# Patient Record
Sex: FEMALE | Race: OTHER | HISPANIC OR LATINO | URBAN - METROPOLITAN AREA
[De-identification: names, ages, dates, MRNs, and addresses within clinical notes are randomized per-mention and may not be internally consistent; named-entity substitution may affect disease eponyms.]

---

## 2023-02-06 ENCOUNTER — EMERGENCY (EMERGENCY)
Facility: HOSPITAL | Age: 38
LOS: 1 days | Discharge: HOME | End: 2023-02-06
Attending: EMERGENCY MEDICINE
Payer: MEDICAID

## 2023-02-06 VITALS
SYSTOLIC BLOOD PRESSURE: 129 MMHG | TEMPERATURE: 99 F | HEART RATE: 113 BPM | RESPIRATION RATE: 18 BRPM | OXYGEN SATURATION: 97 % | WEIGHT: 130.51 LBS | HEIGHT: 60 IN | DIASTOLIC BLOOD PRESSURE: 80 MMHG

## 2023-02-06 VITALS — HEART RATE: 90 BPM

## 2023-02-06 LAB
ALBUMIN SERPL ELPH-MCNC: 4.2 G/DL — SIGNIFICANT CHANGE UP (ref 3.3–5.2)
ALP SERPL-CCNC: 87 U/L — SIGNIFICANT CHANGE UP (ref 40–120)
ALT FLD-CCNC: 28 U/L — SIGNIFICANT CHANGE UP
ANION GAP SERPL CALC-SCNC: 11 MMOL/L — SIGNIFICANT CHANGE UP (ref 5–17)
APPEARANCE UR: CLEAR — SIGNIFICANT CHANGE UP
AST SERPL-CCNC: 30 U/L — SIGNIFICANT CHANGE UP
BACTERIA # UR AUTO: ABNORMAL
BASOPHILS # BLD AUTO: 0.02 K/UL — SIGNIFICANT CHANGE UP (ref 0–0.2)
BASOPHILS NFR BLD AUTO: 0.4 % — SIGNIFICANT CHANGE UP (ref 0–2)
BILIRUB SERPL-MCNC: 1 MG/DL — SIGNIFICANT CHANGE UP (ref 0.4–2)
BILIRUB UR-MCNC: NEGATIVE — SIGNIFICANT CHANGE UP
BLD GP AB SCN SERPL QL: SIGNIFICANT CHANGE UP
BUN SERPL-MCNC: 10.7 MG/DL — SIGNIFICANT CHANGE UP (ref 8–20)
CALCIUM SERPL-MCNC: 8.3 MG/DL — LOW (ref 8.4–10.5)
CHLORIDE SERPL-SCNC: 104 MMOL/L — SIGNIFICANT CHANGE UP (ref 96–108)
CO2 SERPL-SCNC: 23 MMOL/L — SIGNIFICANT CHANGE UP (ref 22–29)
COLOR SPEC: YELLOW — SIGNIFICANT CHANGE UP
CREAT SERPL-MCNC: 0.44 MG/DL — LOW (ref 0.5–1.3)
DIFF PNL FLD: ABNORMAL
EGFR: 126 ML/MIN/1.73M2 — SIGNIFICANT CHANGE UP
EOSINOPHIL # BLD AUTO: 0.13 K/UL — SIGNIFICANT CHANGE UP (ref 0–0.5)
EOSINOPHIL NFR BLD AUTO: 2.4 % — SIGNIFICANT CHANGE UP (ref 0–6)
EPI CELLS # UR: SIGNIFICANT CHANGE UP
GLUCOSE SERPL-MCNC: 101 MG/DL — HIGH (ref 70–99)
GLUCOSE UR QL: NEGATIVE MG/DL — SIGNIFICANT CHANGE UP
HCT VFR BLD CALC: 41.2 % — SIGNIFICANT CHANGE UP (ref 34.5–45)
HGB BLD-MCNC: 13.9 G/DL — SIGNIFICANT CHANGE UP (ref 11.5–15.5)
IMM GRANULOCYTES NFR BLD AUTO: 0.4 % — SIGNIFICANT CHANGE UP (ref 0–0.9)
KETONES UR-MCNC: NEGATIVE — SIGNIFICANT CHANGE UP
LEUKOCYTE ESTERASE UR-ACNC: NEGATIVE — SIGNIFICANT CHANGE UP
LIDOCAIN IGE QN: 20 U/L — LOW (ref 22–51)
LYMPHOCYTES # BLD AUTO: 1.6 K/UL — SIGNIFICANT CHANGE UP (ref 1–3.3)
LYMPHOCYTES # BLD AUTO: 29.6 % — SIGNIFICANT CHANGE UP (ref 13–44)
MCHC RBC-ENTMCNC: 29.8 PG — SIGNIFICANT CHANGE UP (ref 27–34)
MCHC RBC-ENTMCNC: 33.7 GM/DL — SIGNIFICANT CHANGE UP (ref 32–36)
MCV RBC AUTO: 88.4 FL — SIGNIFICANT CHANGE UP (ref 80–100)
MONOCYTES # BLD AUTO: 0.62 K/UL — SIGNIFICANT CHANGE UP (ref 0–0.9)
MONOCYTES NFR BLD AUTO: 11.5 % — SIGNIFICANT CHANGE UP (ref 2–14)
NEUTROPHILS # BLD AUTO: 3.02 K/UL — SIGNIFICANT CHANGE UP (ref 1.8–7.4)
NEUTROPHILS NFR BLD AUTO: 55.7 % — SIGNIFICANT CHANGE UP (ref 43–77)
NITRITE UR-MCNC: NEGATIVE — SIGNIFICANT CHANGE UP
OB PNL STL: NEGATIVE — SIGNIFICANT CHANGE UP
PH UR: 7 — SIGNIFICANT CHANGE UP (ref 5–8)
PLATELET # BLD AUTO: 333 K/UL — SIGNIFICANT CHANGE UP (ref 150–400)
POTASSIUM SERPL-MCNC: 4 MMOL/L — SIGNIFICANT CHANGE UP (ref 3.5–5.3)
POTASSIUM SERPL-SCNC: 4 MMOL/L — SIGNIFICANT CHANGE UP (ref 3.5–5.3)
PROT SERPL-MCNC: 7.3 G/DL — SIGNIFICANT CHANGE UP (ref 6.6–8.7)
PROT UR-MCNC: NEGATIVE — SIGNIFICANT CHANGE UP
RBC # BLD: 4.66 M/UL — SIGNIFICANT CHANGE UP (ref 3.8–5.2)
RBC # FLD: 12.6 % — SIGNIFICANT CHANGE UP (ref 10.3–14.5)
RBC CASTS # UR COMP ASSIST: SIGNIFICANT CHANGE UP /HPF (ref 0–4)
SODIUM SERPL-SCNC: 138 MMOL/L — SIGNIFICANT CHANGE UP (ref 135–145)
SP GR SPEC: 1.01 — SIGNIFICANT CHANGE UP (ref 1.01–1.02)
UROBILINOGEN FLD QL: NEGATIVE MG/DL — SIGNIFICANT CHANGE UP
WBC # BLD: 5.41 K/UL — SIGNIFICANT CHANGE UP (ref 3.8–10.5)
WBC # FLD AUTO: 5.41 K/UL — SIGNIFICANT CHANGE UP (ref 3.8–10.5)
WBC UR QL: SIGNIFICANT CHANGE UP /HPF (ref 0–5)

## 2023-02-06 PROCEDURE — 96361 HYDRATE IV INFUSION ADD-ON: CPT

## 2023-02-06 PROCEDURE — 74178 CT ABD&PLV WO CNTR FLWD CNTR: CPT | Mod: MA

## 2023-02-06 PROCEDURE — 85025 COMPLETE CBC W/AUTO DIFF WBC: CPT

## 2023-02-06 PROCEDURE — 84702 CHORIONIC GONADOTROPIN TEST: CPT

## 2023-02-06 PROCEDURE — 80053 COMPREHEN METABOLIC PANEL: CPT

## 2023-02-06 PROCEDURE — 36415 COLL VENOUS BLD VENIPUNCTURE: CPT

## 2023-02-06 PROCEDURE — 96374 THER/PROPH/DIAG INJ IV PUSH: CPT | Mod: XU

## 2023-02-06 PROCEDURE — 99284 EMERGENCY DEPT VISIT MOD MDM: CPT

## 2023-02-06 PROCEDURE — 74178 CT ABD&PLV WO CNTR FLWD CNTR: CPT | Mod: 26,MA

## 2023-02-06 PROCEDURE — 86850 RBC ANTIBODY SCREEN: CPT

## 2023-02-06 PROCEDURE — 86900 BLOOD TYPING SEROLOGIC ABO: CPT

## 2023-02-06 PROCEDURE — 87086 URINE CULTURE/COLONY COUNT: CPT

## 2023-02-06 PROCEDURE — 83690 ASSAY OF LIPASE: CPT

## 2023-02-06 PROCEDURE — 81001 URINALYSIS AUTO W/SCOPE: CPT

## 2023-02-06 PROCEDURE — 86901 BLOOD TYPING SEROLOGIC RH(D): CPT

## 2023-02-06 PROCEDURE — 99284 EMERGENCY DEPT VISIT MOD MDM: CPT | Mod: 25

## 2023-02-06 PROCEDURE — 82272 OCCULT BLD FECES 1-3 TESTS: CPT

## 2023-02-06 RX ORDER — PANTOPRAZOLE SODIUM 20 MG/1
1 TABLET, DELAYED RELEASE ORAL
Qty: 14 | Refills: 0
Start: 2023-02-06 | End: 2023-02-19

## 2023-02-06 RX ORDER — ONDANSETRON 8 MG/1
1 TABLET, FILM COATED ORAL
Qty: 9 | Refills: 0
Start: 2023-02-06 | End: 2023-02-08

## 2023-02-06 RX ORDER — PANTOPRAZOLE SODIUM 20 MG/1
80 TABLET, DELAYED RELEASE ORAL ONCE
Refills: 0 | Status: COMPLETED | OUTPATIENT
Start: 2023-02-06 | End: 2023-02-06

## 2023-02-06 RX ORDER — SODIUM CHLORIDE 9 MG/ML
1000 INJECTION INTRAMUSCULAR; INTRAVENOUS; SUBCUTANEOUS ONCE
Refills: 0 | Status: COMPLETED | OUTPATIENT
Start: 2023-02-06 | End: 2023-02-06

## 2023-02-06 RX ADMIN — PANTOPRAZOLE SODIUM 80 MILLIGRAM(S): 20 TABLET, DELAYED RELEASE ORAL at 17:11

## 2023-02-06 RX ADMIN — SODIUM CHLORIDE 1000 MILLILITER(S): 9 INJECTION INTRAMUSCULAR; INTRAVENOUS; SUBCUTANEOUS at 18:13

## 2023-02-06 RX ADMIN — SODIUM CHLORIDE 1000 MILLILITER(S): 9 INJECTION INTRAMUSCULAR; INTRAVENOUS; SUBCUTANEOUS at 17:12

## 2023-02-06 NOTE — ED PROVIDER NOTE - OBJECTIVE STATEMENT
37 yoF with PMHx of s/p cholecystectomy now p/w abdominal pain-- midepigastric radiating to the back since this morning with associated nausea, NBNB emesis, and black stools. Denies diarrhea, smoking, fever, chills, hx of pancreatitis, hx of colonoscopy.     PMH: s/p cholecystectomy; LNMP: 1/15/23; Allergy to Antalgina (medication given in [place])  SOCIAL: -social EtOH use, denies tobacco/illicit drug use; : Brett

## 2023-02-06 NOTE — ED PROVIDER NOTE - CLINICAL SUMMARY MEDICAL DECISION MAKING FREE TEXT BOX
(ANITA Martinez MD) Initial Assessment: (2/6/2023 1619) 27 y/p female with PMHx of cholelithiasis s/p cholecystectomy p/w epigastric pain with dark stools x1 day. Found on exam with LLQ TTP and potential internal hemorrhoids that may be bleeding. Will get labs, CT abd/pel, protonix, and re-assess. Likely will need GI follow up.   ACP to complete summary of medical encounter below.    Summary of Clinical Encounter: (ANITA Martinez MD) Initial Assessment: (2/6/2023 9299) 37 y/p female with PMHx of cholelithiasis s/p cholecystectomy p/w epigastric pain with dark stools x1 day. Found on exam with LLQ TTP and potential internal hemorrhoids that may be bleeding. Will get labs, CT abd/pel, protonix, and re-assess. Likely will need GI follow up.   ACP to complete summary of medical encounter below.    Mansoor LARSON @ 22:45-- 37F presenting with epigastric abd pain radiating to her back x1 day with n/v and black stools. Pts labs wnl, stool guaiac negative, UA negative for infection. Pt resting comfortably in NAD at this time. Pending CT read. (ANITA Martinez MD) Initial Assessment: (2/6/2023 1619) 37 y/p female with PMHx of cholelithiasis s/p cholecystectomy p/w epigastric pain with dark stools x1 day. Found on exam with LLQ TTP and potential internal hemorrhoids that may be bleeding. Will get labs, CT abd/pel, protonix, and re-assess. Likely will need GI follow up.   ACP to complete summary of medical encounter below.    Mansoor LARSON @ 22:45-- 37F presenting with epigastric abd pain radiating to her back x1 day with n/v and black stools. Pts labs wnl, stool guaiac negative, UA negative for infection. Pt resting comfortably in NAD at this time. CT scan shows no GI bleed, with a dilated CBD. Pt is s/p cholecystectomy therefore dilated CBD likely due to past surgery. Pts LFTs/bilirubin within normal limit, pt states that she feels much better and her abd is soft/nt on re-exam. Pt stable for d/c with outpt GI f/u. [id number] Comprehensive

## 2023-02-06 NOTE — ED PROVIDER NOTE - CARE PROVIDER_API CALL
Bronson Mathews)  Gastroenterology; Internal Medicine  [address] [place] [address]  Phone: [phone]  Fax: [phone]  Follow Up Time:

## 2023-02-06 NOTE — ED PROVIDER NOTE - NS ED ATTENDING STATEMENT MOD
This was a shared visit with the EZ. I reviewed and verified the documentation and independently performed the documented:

## 2023-02-06 NOTE — ED ADULT TRIAGE NOTE - CHIEF COMPLAINT QUOTE
pt c/o left side abd pain started this am, + NVD  A&Ox3, resp wnl, crying, having black stool & feels lumps by anus

## 2023-02-06 NOTE — ED PROVIDER NOTE - PHYSICAL EXAMINATION
General:     NAD, well-nourished, well-appearing  Head:     NC/AT, EOMI, oral mucosa moist  Neck:     trachea midline  Lungs:     CTA b/l, no w/r/r  CVS:     S1S2, RRR, no m/g/r  Abd:     +BS, s/(+) TTP @ LLQ, greater than epigastrium, nd, no organomegaly  Gu:      (+) palpable internal hemorrhoid at 5 o'clock, dark stool no active bleeding (Chaperone Rosio White, Scribe)  Ext:    2+ radial and pedal pulses, no c/c/e  Neuro: AAOx3, no sensory/motor deficits

## 2023-02-06 NOTE — ED PROVIDER NOTE - DR. NAME
Amie Sepulveda is a 31 y.o. female.    Chief Complaint   Patient presents with   • Anxiety   • Depression       HPI   Patient presents today to establish care.  She has a long history of anxiety and depression.  She reports trying zoloft, prozac, lexapro.  Zoloft caused worsening depression.  Prozac worked okay for a little while.  Has been on lexapro for the last several months without response. Has tried abilify in the past and reports feeling very restless with this medication.  Admits feelings of hopelessness and worthlessness.  She denies crying spells.  Reports always nervous and worried.  Does not have frequent anxiety.  She denies trouble sleeping.  She feels like she could sleep at any time of the day.  Not currently breast feeding.  She is open to counseling.  Did telehealth counseling with previous insurance.   She also reports h/o ADHD.  She was previously on adderall for this with improvement in symptoms.  She does admit to weight loss and fatigue.       The following portions of the patient's history were reviewed and updated as appropriate: allergies, current medications, past family history, past medical history, past social history, past surgical history and problem list.     Past Medical History:   Diagnosis Date   • ADHD    • Anxiety    • Depression        Past Surgical History:   Procedure Laterality Date   • APPENDECTOMY     • DILATATION AND CURETTAGE     • ULNAR NERVE TRANSPOSITION     • WISDOM TOOTH EXTRACTION         Family History   Problem Relation Age of Onset   • Lung cancer Paternal Grandfather         smoker   • Hypertension Father    • Diabetes Maternal Grandfather    • Kidney disease Paternal Grandmother    • Heart disease Paternal Grandmother        Social History     Socioeconomic History   • Marital status:      Spouse name: Not on file   • Number of children: Not on file   • Years of education: Not on file   • Highest education level: Not on file   Tobacco Use   •  "Smoking status: Never Smoker   • Smokeless tobacco: Never Used   Substance and Sexual Activity   • Alcohol use: No   • Drug use: No   • Sexual activity: Yes     Partners: Male       Allergies   Allergen Reactions   • Penicillins Rash         Current Outpatient Medications:   •  escitalopram (LEXAPRO) 10 MG tablet, Take 10 mg by mouth Daily., Disp: , Rfl:     ROS    Review of Systems   Constitutional: Positive for appetite change, fatigue and unexpected weight loss. Negative for chills and fever.   HENT: Negative for congestion, postnasal drip and sore throat.    Eyes: Negative for blurred vision and visual disturbance.   Respiratory: Negative for cough, shortness of breath and wheezing.    Cardiovascular: Negative for chest pain and leg swelling.   Gastrointestinal: Negative for abdominal pain, constipation, diarrhea, nausea and vomiting.   Endocrine: Positive for cold intolerance. Negative for heat intolerance.   Genitourinary: Negative for dysuria and frequency.   Musculoskeletal: Negative for arthralgias and back pain.   Skin: Negative for color change and rash.   Allergic/Immunologic: Negative for environmental allergies.   Neurological: Negative for weakness, numbness and headache.   Hematological: Does not bruise/bleed easily.   Psychiatric/Behavioral: Positive for depressed mood. The patient is nervous/anxious.        Vitals:    09/20/21 1521   BP: 100/68   Pulse: 78   Resp: 16   Temp: 98 °F (36.7 °C)   SpO2: 98%   Weight: 46.6 kg (102 lb 12.8 oz)   Height: 157.5 cm (62\")   PainSc: 0-No pain     Body mass index is 18.8 kg/m².    Physical Exam     Physical Exam  Constitutional:       General: She is not in acute distress.     Appearance: Normal appearance. She is well-developed.   HENT:      Head: Normocephalic and atraumatic.      Right Ear: Tympanic membrane and external ear normal.      Left Ear: Tympanic membrane and external ear normal.   Eyes:      Extraocular Movements: Extraocular movements intact.     "  Conjunctiva/sclera: Conjunctivae normal.      Pupils: Pupils are equal, round, and reactive to light.   Cardiovascular:      Rate and Rhythm: Normal rate and regular rhythm.      Heart sounds: No murmur heard.     Pulmonary:      Effort: Pulmonary effort is normal. No respiratory distress.      Breath sounds: Normal breath sounds. No wheezing.   Abdominal:      General: Bowel sounds are normal. There is no distension.      Palpations: Abdomen is soft.      Tenderness: There is no abdominal tenderness.   Musculoskeletal:      Cervical back: Normal range of motion and neck supple.      Right lower leg: No edema.      Left lower leg: No edema.   Lymphadenopathy:      Cervical: No cervical adenopathy.   Skin:     General: Skin is warm and dry.   Neurological:      Mental Status: She is alert and oriented to person, place, and time.      Cranial Nerves: No cranial nerve deficit.      Deep Tendon Reflexes: Reflexes normal.   Psychiatric:         Mood and Affect: Mood normal.         Behavior: Behavior normal.         Assessment/Plan    Problems Addressed this Visit        Mental Health    Anxiety and depression - Primary     Uncontrolled on current medication.  At this time patient will continue Lexapro.  Will obtain GeneSight testing to determine most appropriate medication for the patient.  Discussed possibly trying Paxil to help with weight gain and mental health.         Relevant Medications    escitalopram (LEXAPRO) 10 MG tablet    Other Relevant Orders    GeneSight - Swab,    Attention deficit hyperactivity disorder (ADHD)    Relevant Medications    escitalopram (LEXAPRO) 10 MG tablet    Other Relevant Orders    Ambulatory Referral to Psychiatry    GeneSight - Swab,      Other Visit Diagnoses     Fatigue, unspecified type        Relevant Orders    CBC & Differential (Completed)    Comprehensive Metabolic Panel (Completed)    TSH (Completed)    T4, Free (Completed)    Vitamin B12 (Completed)    Folate (Completed)     Vitamin D 25 Hydroxy (Completed)    Weight loss        Relevant Orders    TSH (Completed)    T4, Free (Completed)     No orders of the defined types were placed in this encounter.      No orders of the defined types were placed in this encounter.      Return in about 6 weeks (around 11/1/2021) for anxiety and depression.      Swati Alba,    Juan

## 2023-02-07 LAB
CULTURE RESULTS: SIGNIFICANT CHANGE UP
SPECIMEN SOURCE: SIGNIFICANT CHANGE UP

## 2023-03-10 NOTE — ED PROVIDER NOTE - CARE PLAN
1 Principal Discharge DX:	Abdominal pain   Skyrizi Counseling: I discussed with the patient the risks of risankizumab-rzaa including but not limited to immunosuppression, and serious infections.  The patient understands that monitoring is required including a PPD at baseline and must alert us or the primary physician if symptoms of infection or other concerning signs are noted.

## 2023-06-03 ENCOUNTER — EMERGENCY (EMERGENCY)
Facility: HOSPITAL | Age: 38
LOS: 1 days | Discharge: HOME | End: 2023-06-03
Attending: EMERGENCY MEDICINE
Payer: MEDICAID

## 2023-06-03 VITALS
DIASTOLIC BLOOD PRESSURE: 77 MMHG | RESPIRATION RATE: 18 BRPM | HEART RATE: 90 BPM | TEMPERATURE: 99 F | WEIGHT: 129.63 LBS | SYSTOLIC BLOOD PRESSURE: 114 MMHG | OXYGEN SATURATION: 99 %

## 2023-06-03 LAB
ALBUMIN SERPL ELPH-MCNC: 3.9 G/DL — SIGNIFICANT CHANGE UP (ref 3.3–5.2)
ALP SERPL-CCNC: 89 U/L — SIGNIFICANT CHANGE UP (ref 40–120)
ALT FLD-CCNC: 28 U/L — SIGNIFICANT CHANGE UP
ANION GAP SERPL CALC-SCNC: 11 MMOL/L — SIGNIFICANT CHANGE UP (ref 5–17)
AST SERPL-CCNC: 37 U/L — HIGH
BASOPHILS # BLD AUTO: 0.04 K/UL — SIGNIFICANT CHANGE UP (ref 0–0.2)
BASOPHILS NFR BLD AUTO: 0.9 % — SIGNIFICANT CHANGE UP (ref 0–2)
BILIRUB SERPL-MCNC: 0.6 MG/DL — SIGNIFICANT CHANGE UP (ref 0.4–2)
BUN SERPL-MCNC: 13.3 MG/DL — SIGNIFICANT CHANGE UP (ref 8–20)
CALCIUM SERPL-MCNC: 8.7 MG/DL — SIGNIFICANT CHANGE UP (ref 8.4–10.5)
CHLORIDE SERPL-SCNC: 104 MMOL/L — SIGNIFICANT CHANGE UP (ref 96–108)
CO2 SERPL-SCNC: 21 MMOL/L — LOW (ref 22–29)
CREAT SERPL-MCNC: 0.48 MG/DL — LOW (ref 0.5–1.3)
EGFR: 123 ML/MIN/1.73M2 — SIGNIFICANT CHANGE UP
EOSINOPHIL # BLD AUTO: 0.16 K/UL — SIGNIFICANT CHANGE UP (ref 0–0.5)
EOSINOPHIL NFR BLD AUTO: 3.5 % — SIGNIFICANT CHANGE UP (ref 0–6)
GIANT PLATELETS BLD QL SMEAR: PRESENT — SIGNIFICANT CHANGE UP
GLUCOSE SERPL-MCNC: 92 MG/DL — SIGNIFICANT CHANGE UP (ref 70–99)
HCG SERPL-ACNC: <4 MIU/ML — SIGNIFICANT CHANGE UP
HCT VFR BLD CALC: 40.2 % — SIGNIFICANT CHANGE UP (ref 34.5–45)
HGB BLD-MCNC: 13.8 G/DL — SIGNIFICANT CHANGE UP (ref 11.5–15.5)
LIDOCAIN IGE QN: 21 U/L — LOW (ref 22–51)
LYMPHOCYTES # BLD AUTO: 0.83 K/UL — LOW (ref 1–3.3)
LYMPHOCYTES # BLD AUTO: 18.2 % — SIGNIFICANT CHANGE UP (ref 13–44)
MANUAL SMEAR VERIFICATION: SIGNIFICANT CHANGE UP
MCHC RBC-ENTMCNC: 30.6 PG — SIGNIFICANT CHANGE UP (ref 27–34)
MCHC RBC-ENTMCNC: 34.3 GM/DL — SIGNIFICANT CHANGE UP (ref 32–36)
MCV RBC AUTO: 89.1 FL — SIGNIFICANT CHANGE UP (ref 80–100)
MONOCYTES # BLD AUTO: 0.52 K/UL — SIGNIFICANT CHANGE UP (ref 0–0.9)
MONOCYTES NFR BLD AUTO: 11.3 % — SIGNIFICANT CHANGE UP (ref 2–14)
NEUTROPHILS # BLD AUTO: 2.91 K/UL — SIGNIFICANT CHANGE UP (ref 1.8–7.4)
NEUTROPHILS NFR BLD AUTO: 63.5 % — SIGNIFICANT CHANGE UP (ref 43–77)
PLAT MORPH BLD: NORMAL — SIGNIFICANT CHANGE UP
PLATELET # BLD AUTO: 267 K/UL — SIGNIFICANT CHANGE UP (ref 150–400)
POTASSIUM SERPL-MCNC: 3.6 MMOL/L — SIGNIFICANT CHANGE UP (ref 3.5–5.3)
POTASSIUM SERPL-SCNC: 3.6 MMOL/L — SIGNIFICANT CHANGE UP (ref 3.5–5.3)
PROT SERPL-MCNC: 7.4 G/DL — SIGNIFICANT CHANGE UP (ref 6.6–8.7)
RBC # BLD: 4.51 M/UL — SIGNIFICANT CHANGE UP (ref 3.8–5.2)
RBC # FLD: 12.4 % — SIGNIFICANT CHANGE UP (ref 10.3–14.5)
RBC BLD AUTO: NORMAL — SIGNIFICANT CHANGE UP
SODIUM SERPL-SCNC: 136 MMOL/L — SIGNIFICANT CHANGE UP (ref 135–145)
VARIANT LYMPHS # BLD: 2.6 % — SIGNIFICANT CHANGE UP (ref 0–6)
WBC # BLD: 4.58 K/UL — SIGNIFICANT CHANGE UP (ref 3.8–10.5)
WBC # FLD AUTO: 4.58 K/UL — SIGNIFICANT CHANGE UP (ref 3.8–10.5)

## 2023-06-03 PROCEDURE — 96375 TX/PRO/DX INJ NEW DRUG ADDON: CPT

## 2023-06-03 PROCEDURE — 99284 EMERGENCY DEPT VISIT MOD MDM: CPT

## 2023-06-03 PROCEDURE — T1013: CPT

## 2023-06-03 PROCEDURE — 96374 THER/PROPH/DIAG INJ IV PUSH: CPT

## 2023-06-03 PROCEDURE — 36415 COLL VENOUS BLD VENIPUNCTURE: CPT

## 2023-06-03 PROCEDURE — 85025 COMPLETE CBC W/AUTO DIFF WBC: CPT

## 2023-06-03 PROCEDURE — 80053 COMPREHEN METABOLIC PANEL: CPT

## 2023-06-03 PROCEDURE — 83690 ASSAY OF LIPASE: CPT

## 2023-06-03 PROCEDURE — 99284 EMERGENCY DEPT VISIT MOD MDM: CPT | Mod: 25

## 2023-06-03 PROCEDURE — 84702 CHORIONIC GONADOTROPIN TEST: CPT

## 2023-06-03 RX ORDER — KETOROLAC TROMETHAMINE 30 MG/ML
30 SYRINGE (ML) INJECTION ONCE
Refills: 0 | Status: DISCONTINUED | OUTPATIENT
Start: 2023-06-03 | End: 2023-06-03

## 2023-06-03 RX ORDER — ONDANSETRON 8 MG/1
4 TABLET, FILM COATED ORAL ONCE
Refills: 0 | Status: COMPLETED | OUTPATIENT
Start: 2023-06-03 | End: 2023-06-03

## 2023-06-03 RX ORDER — ONDANSETRON 8 MG/1
1 TABLET, FILM COATED ORAL
Qty: 3 | Refills: 0
Start: 2023-06-03

## 2023-06-03 RX ORDER — SODIUM CHLORIDE 9 MG/ML
1000 INJECTION INTRAMUSCULAR; INTRAVENOUS; SUBCUTANEOUS ONCE
Refills: 0 | Status: COMPLETED | OUTPATIENT
Start: 2023-06-03 | End: 2023-06-03

## 2023-06-03 RX ADMIN — SODIUM CHLORIDE 1000 MILLILITER(S): 9 INJECTION INTRAMUSCULAR; INTRAVENOUS; SUBCUTANEOUS at 16:52

## 2023-06-03 RX ADMIN — Medication 30 MILLIGRAM(S): at 16:51

## 2023-06-03 RX ADMIN — ONDANSETRON 4 MILLIGRAM(S): 8 TABLET, FILM COATED ORAL at 16:51

## 2023-06-03 NOTE — ED ADULT NURSE NOTE - NSFALLUNIVINTERV_ED_ALL_ED
Bed/Stretcher in lowest position, wheels locked, appropriate side rails in place/Call bell, personal items and telephone in reach/Instruct patient to call for assistance before getting out of bed/chair/stretcher/Non-slip footwear applied when patient is off stretcher/[place] to call system/Physically safe environment - no spills, clutter or unnecessary equipment/Purposeful proactive rounding/Room/bathroom lighting operational, light cord in reach

## 2023-06-03 NOTE — ED ADULT NURSE NOTE - OBJECTIVE STATEMENT
pt with abdominal pain, nausea, vomiting, black colored diarrhea, headache and body aches x2 days. pt reports similar sx occurred in february and was told she had colon inflammation. hx of cholecystectomy. PIV placed, labs obtained. medicated as ordered.

## 2023-06-03 NOTE — ED ADULT NURSE NOTE - NURSING MUSC ROM
Mom reports that she is currently at Urgent Care with patient for evaluation.  No triage/ contact.     Reason for Disposition  • Already left for the hospital/clinic    Protocols used: NO CONTACT OR DUPLICATE CONTACT CALL-P-AH       full range of motion in all extremities

## 2023-06-03 NOTE — ED PROVIDER NOTE - PATIENT PORTAL LINK FT
You can access the FollowMyHealth Patient Portal offered by [facility] by registering at the following website: [email or web address]. By joining [employer]’s FollowMyHealth portal, you will also be able to view your health information using other applications (apps) compatible with our system.

## 2023-06-03 NOTE — ED PROVIDER NOTE - CONSTITUTIONAL, MLM
normal... Well appearing, awake, alert, oriented to person, place, time/situation and in no apparent distress. ill appearing, awake, alert, oriented to person, place, time/situation and in no apparent distress.

## 2023-06-03 NOTE — ED PROVIDER NOTE - OBJECTIVE STATEMENT
37 y/o female 37 y/o female with pmhx of cholecystomy,  c/o nausea, vomiting, black colored diarrhea, headache and body aches x2 days. Denies sick contact. Pt had similar symptoms in [place] was evaluated here and told to have "colon inflammation". Pt took antibiotics for symptoms w/o relief. allergic to antalgina (peru medication)   Eugene

## 2023-06-03 NOTE — ED PROVIDER NOTE - ATTENDING APP SHARED VISIT CONTRIBUTION OF CARE
I, Camelia Ayala, performed the initial face to face bedside interview with this patient regarding history of present illness, review of symptoms and relevant past medical, social and family history.  I completed an independent physical examination.  I was the initial provider who evaluated this patient. I have signed out the follow up of any pending tests (i.e. labs, radiological studies) to the ACP.  I have communicated the patient’s plan of care and disposition with the ACP.  The history, relevant review of systems, past medical and surgical history, medical decision making, and physical examination was documented by the scribe in my presence and I attest to the accuracy of the documentation.

## 2023-06-03 NOTE — ED PROVIDER NOTE - NSFOLLOWUPINSTRUCTIONS_ED_ALL_ED_FT
Por favor, tome la medicación según las indicaciones. [place] ibuprofeno 600 mg cada 6 horas según sea necesario para el dolor [place] tylenol 650 mg cada 6 horas según sea necesario para el dolor Seguimiento con el médico de atención primaria en 2-3 días Regrese a la radha de emergencias por síntomas nuevos o que empeoran  Diarrea  La diarrea son evacuaciones intestinales sueltas o acuosas frecuentes que tienen muchas causas. La diarrea puede hacer que se sienta débil y que se deshidrate. La diarrea generalmente dura de 2 a 3 días, tyler puede durar más si es un signo de algo más grave. Carmella líquidos foreign para prevenir la deshidratación. Coma alimentos suaves y fáciles de digerir según los tolere.  BUSQUE ATENCIÓN MÉDICA INMEDIATA SI TIENE ALGUNO DE LOS SIGUIENTES SÍNTOMAS: fiebre johanny, aturdimiento/mareos, dolor de pecho, heces negras o con kenya, dificultad para respirar, dolor abdominal o de espalda intenso, o cualquier signo de deshidratación.

## 2023-06-03 NOTE — ED PROVIDER NOTE - CLINICAL SUMMARY MEDICAL DECISION MAKING FREE TEXT BOX
pt p/w nausea, vomiting, black diarrhea, HA and body aches x2 days. Exam significant for lower abdominal pain, no rebound or guarding. Will check labs, treat symptoms and re-eval. pt p/w nausea, vomiting, black diarrhea, HA and body aches x2 days. Exam significant for lower abdominal pain, no rebound or guarding. Will check labs, treat symptoms and re-eval.    Rohini LARSON : labs unremarkable pt reassessed w  leia states she feels much better and abdomen soft non-tender to palpation. tolerating po. ?viral. will dc supportive care and discussed return for worsening pain /etc

## 2023-06-04 ENCOUNTER — EMERGENCY (EMERGENCY)
Facility: HOSPITAL | Age: 38
LOS: 1 days | Discharge: HOME | End: 2023-06-04
Attending: EMERGENCY MEDICINE
Payer: MEDICAID

## 2023-06-04 VITALS
WEIGHT: 132.28 LBS | RESPIRATION RATE: 18 BRPM | OXYGEN SATURATION: 99 % | SYSTOLIC BLOOD PRESSURE: 108 MMHG | DIASTOLIC BLOOD PRESSURE: 78 MMHG | HEIGHT: 63 IN | TEMPERATURE: 99 F | HEART RATE: 88 BPM

## 2023-06-04 VITALS
OXYGEN SATURATION: 97 % | SYSTOLIC BLOOD PRESSURE: 109 MMHG | TEMPERATURE: 98 F | DIASTOLIC BLOOD PRESSURE: 82 MMHG | RESPIRATION RATE: 16 BRPM | HEART RATE: 81 BPM

## 2023-06-04 PROCEDURE — 99284 EMERGENCY DEPT VISIT MOD MDM: CPT | Mod: 25

## 2023-06-04 PROCEDURE — 96375 TX/PRO/DX INJ NEW DRUG ADDON: CPT

## 2023-06-04 PROCEDURE — 99284 EMERGENCY DEPT VISIT MOD MDM: CPT

## 2023-06-04 PROCEDURE — 96374 THER/PROPH/DIAG INJ IV PUSH: CPT

## 2023-06-04 RX ORDER — DIPHENHYDRAMINE HCL 50 MG
2 CAPSULE ORAL
Qty: 30 | Refills: 0
Start: 2023-06-04 | End: 2023-06-08

## 2023-06-04 RX ORDER — DIPHENHYDRAMINE HCL 50 MG
50 CAPSULE ORAL ONCE
Refills: 0 | Status: COMPLETED | OUTPATIENT
Start: 2023-06-04 | End: 2023-06-04

## 2023-06-04 RX ORDER — FAMOTIDINE 10 MG/ML
20 INJECTION INTRAVENOUS ONCE
Refills: 0 | Status: COMPLETED | OUTPATIENT
Start: 2023-06-04 | End: 2023-06-04

## 2023-06-04 RX ADMIN — Medication 125 MILLIGRAM(S): at 03:57

## 2023-06-04 RX ADMIN — FAMOTIDINE 20 MILLIGRAM(S): 10 INJECTION INTRAVENOUS at 03:57

## 2023-06-04 RX ADMIN — Medication 50 MILLIGRAM(S): at 03:57

## 2023-06-04 NOTE — ED ADULT TRIAGE NOTE - BSA (M2)
Pt dropped off note informing AMS she received first Shingrix 7/31/19. Updated immunization record. Placed in AMS bin.
1.62

## 2023-06-04 NOTE — ED ADULT TRIAGE NOTE - CHIEF COMPLAINT QUOTE
pt states she is having allergic reaction, has hives all over & difficulty breathing  left the ED and about 2 hrs later started with hives all over body, pt received Toradol in the ED

## 2023-06-04 NOTE — ED ADULT NURSE NOTE - OBJECTIVE STATEMENT
Assumed care of patient in CC area of ED following triage. Patient presents to ED c/o rash and nausea following presumed administration of IV ketorolac in ED 2 hrs prior. Patient Assumed care of patient in CC area of ED following triage. Patient presents to ED c/o rash and nausea following presumed administration of IV ketorolac in ED 2 hrs prior. Patient placed on SPO2 WNL on RA, able to tolerate secretions appropriately, RR even and unlabored.

## 2023-06-04 NOTE — ED PROVIDER NOTE - NSFOLLOWUPINSTRUCTIONS_ED_ALL_ED_FT
Paciente: SHONA VELAPATINOZAVALETA  Profesional que asiste al paciente: Parveen Mckeon  Alergias en los adultos  Allergies, Adult    Amilcar alergia es amilcar afección que se caracteriza porque el sistema de defensa del cuerpo (sistema inmunitario) entra en contacto con un alérgeno y reacciona a roselyn. Un alérgeno es cualquier cosa que causa amilcar reacción alérgica. Los alérgenos hacen que el sistema inmunitario produzca proteínas para combatir las infecciones (anticuerpos). Estos anticuerpos hacen que las células liberen sustancias químicas llamadas histaminas que provocan los síntomas de amilcar reacción alérgica.    Las alergias suelen afectar las fosas nasales (rinitis alérgica), los ojos (conjuntivitis alérgica), la piel (dermatitis atópica) y el estómago. Las alergias pueden ser leves, moderadas o graves. No se pueden transmitir de amilcar persona a otra. Las alergias pueden aparecer a cualquier edad y se pueden superar con los años.    ¿Cuáles son las causas?  Esta afección es causada por alérgenos. Entre los alérgenos más comunes se encuentran los siguientes:    Alérgenos de exterior, debora el polen, el humo de los automóviles y el moho.  Alérgenos internos, debora el polvo, el humo, el moho y la caspa de las mascotas.  Otros alérgenos, debora los alimentos, los medicamentos, los perfumes, las picaduras de insectos y otros factores que irritan la piel.    ¿Qué incrementa el riesgo?  Es más probable que tenga esta afección si:    Tiene familiares con alergias.  Tiene familiares con cualquier afección que pueda ser causada por alérgenos, debora el asma. [employer] puede hacer que usted sea más propenso a tener otras alergias.    ¿Cuáles son los signos o síntomas?  Los síntomas de esta afección dependen de la gravedad de la alergia.        Síntomas leves o moderados    Nariz tapada o que gotea (congestión nasal) o estornudos.  Picazón en la boca, los oídos o la garganta.  Sensación de mucosidad que gotea por la parte posterior de la garganta (goteo posnasal).  Dolor de garganta.  Ojos rojos, lagrimosos, hinchados o con picazón.  Zonas de la piel hinchadas, enrojecidas y con picazón (ronchas) o erupción.  Cólicos estomacales o meteorismo.        Síntomas graves    Las alergias graves a los alimentos, los medicamentos o las picaduras de insectos pueden causar anafilaxia, lo que puede poner en peligro la juarez. Algunos de los síntomas son los siguientes:    Enrojecimiento (rubor) en el gustavo.  Tos o sibilancias.  Labios, lengua o boca hinchados.  Hinchazón u opresión en la garganta.  Dolor u opresión en el pecho, o latidos cardíacos acelerados.  Dificultad para respirar o falta de aire.  Dolor en el abdomen, vómitos o diarrea.  Mareos o desmayos.    ¿Cómo se diagnostica?  Esta afección se diagnostica en función de ayanna síntomas, antecedentes médicos y familiares y un examen físico. También pueden hacerle estudios, que incluyen los siguientes:    Pruebas cutáneas para yue cómo reacciona la piel a los alérgenos que pueden estar causando los síntomas. Las pruebas incluyen:    Prueba de punción. Para esta prueba, se introduce un alérgeno en el cuerpo a través de amilcar pequeña abertura en la piel.  Prueba intradérmica. Para esta prueba, se inyecta amilcar pequeña cantidad de alérgeno debajo de la primera capa de la piel.  Prueba de parche. Para esta prueba, se coloca amilcar pequeña cantidad de alérgeno sobre la piel. La tomy se cubre y luego se examina después de algunos días.  Análisis de kenya.  Prueba de provocación. Para esta prueba, debe ingerir o inhalar amilcar pequeña cantidad de alérgeno para yue si produce amilcar reacción alérgica.    También pueden pedirle que:    Lleve un registro de los alimentos que come. Incluye todos los alimentos, las bebidas y los síntomas diarios.  Pruebe amilcar dieta de eliminación. Para hacer esto:    Retire ciertos alimentos de la dieta.  Vuelva a incorporar esos alimentos janel por janel para averiguar si hay algún alimento que le cause amilcar reacción alérgica.    ¿Cómo se trata?      El tratamiento para las alergias depende de los síntomas. El tratamiento puede incluir:    Paños húmedos fríos (compresas frías) para aliviar la picazón y la hinchazón.  Gotas oftálmicas o aerosoles nasales.  Irrigación nasal para ayudar a eliminar la mucosidad o para mantener las fosas nasales húmedas.  Un humidificador para agregar humedad al aire.  Cremas para la piel para tratar las erupciones o la picazón.  Antihistamínicos orales u otros medicamentos para detener la reacción alérgica o para tratar la inflamación.  Cambios en la dieta para eliminar los alimentos que causan alergias.  La exposición repetida a pequeñas cantidades de alérgenos para ayudarle a generar defensas (tolerancia) contra los alérgenos. [employer] se denomina inmunoterapia. Por ejemplo:    Inyección para la alergia. Recibe amilcar inyección que contiene un alérgeno.  Inmunoterapia sublingual. Eleonora amilcar pequeña dosis de alérgeno debajo de la lengua.  Inyección de emergencia para la anafilaxia. Se administra amilcar inyección con amilcar jeringa (autoinyector) contiene la cantidad de medicamento que necesita. El médico le enseñará cómo administrarse la inyección.    Siga estas instrucciones en peters casa:      Medicamentos     [place] o aplíquese los medicamentos de venta yohannes y los recetados solamente debora se lo haya indicado el médico.  Siempre lleve peters lápiz autoinyector si está en riesgo de anafilaxia. Aplíquese amilcar inyección debora se lo haya indicado el médico.        Comida y bebida    Siga las instrucciones del médico respecto de las restricciones en las comidas o las bebidas.  Carmella suficiente líquido debora para mantener la orina de color amarillo pálido.        Instrucciones generales    Use un brazalete o collar de alerta médica para informar a otras personas que ha tenido anafilaxia anteriormente.  Evite los alérgenos conocidos, siempre que sea posible.  Concurra a todas las visitas de seguimiento debora se lo haya indicado el médico. [employer] es importante.    Comuníquese con un médico si:  Los síntomas no mejoran con el tratamiento.    Solicite ayuda de inmediato si:  Tiene síntomas de anafilaxia. [employer] incluye lo siguiente:    Boca, lengua o garganta hinchadas.  Dolor u opresión en el pecho.  Dificultad para respirar o falta de aire.  Mareos o desmayos.  Dolor abdominal intenso, vómitos o diarrea.    Estos síntomas pueden representar un problema grave que constituye amilcar emergencia. No espere a yue si los síntomas desaparecen. Solicite atención médica de inmediato. Comuníquese con el servicio de emergencias de peters localidad (911 en los [place]). No conduzca por ayanna propios medios hasta el hospital.    Resumen  [place] o aplíquese los medicamentos de venta yohannes y los recetados solamente debora se lo haya indicado el médico.  Evite los alérgenos conocidos cuando sea posible.  Siempre lleve peters lápiz autoinyector si está en riesgo de anafilaxia. Aplíquese amilcar inyección debora se lo haya indicado el médico.  Use un brazalete o collar de alerta médica para informar a otras personas que ha tenido anafilaxia anteriormente.  La anafilaxiaes amilcar emergencia potencialmente mortal. Solicite ayuda de inmediato.    NOTAS ADICIONALES E INSTRUCCIONES    Please follow up with your Primary MD in 24-48 hr.  Seek immediate medical care for any new/worsening signs or symptoms.     Document Released: 3/12/2004 Document Revised: 10/28/2020 Document Reviewed: 10/28/2020  Elsevier Interactive Patient Education ©2019 [employer]. This information is not intended to replace advice given to you by your health care provider. Make sure you discuss any questions you have with your health care provider.

## 2023-06-04 NOTE — ED PROVIDER NOTE - PATIENT PORTAL LINK FT
You can access the FollowMyHealth Patient Portal offered by [facility] by registering at the following website: [email or web address]. By joining [employer]’s FollowMyHealth portal, you will also be able to view your health information using other applications (apps) compatible with our system.

## 2023-06-04 NOTE — ED ADULT NURSE NOTE - NSFALLUNIVINTERV_ED_ALL_ED
Bed/Stretcher in lowest position, wheels locked, appropriate side rails in place/Call bell, personal items and telephone in reach/Instruct patient to call for assistance before getting out of bed/chair/stretcher/Non-slip footwear applied when patient is off stretcher/[place] to call system/Physically safe environment - no spills, clutter or unnecessary equipment/Purposeful proactive rounding/Room/bathroom lighting operational, light cord in reach

## 2023-06-04 NOTE — ED PROVIDER NOTE - CLINICAL SUMMARY MEDICAL DECISION MAKING FREE TEXT BOX
patient never hypoxic never with a wheeze no airway compromise much improvement with IV Benadryl Solu-Medrol Pepcid advised to take Benadryl every 6 hours when she gets home avoid NSAIDs at this time follow-up with an allergist return to ED for intractable chest pain shortness of breath or any overall worsening patient and patient's significant other at bedside agree to plan of care

## 2023-06-04 NOTE — ED PROVIDER NOTE - OBJECTIVE STATEMENT
Patient presents to ED with itching hives started about 1 hour prior had total earlier in the day for other complaint.  She denies any drooling or stridor no wheezing no shortness of breath no fever no chest pain or shortness of breath no abdominal pain no nausea vomiting or diarrhea no prior treatment no other acute issues symptoms or concerns

## 2023-06-04 NOTE — ED PROVIDER NOTE - NEURO NEGATIVE STATEMENT, MLM
Mona Mask will be ride home   [phone] if not in [address] [facility] no loss of consciousness, no gait abnormality, no headache, no sensory deficits, and no weakness.

## 2023-06-05 ENCOUNTER — EMERGENCY (EMERGENCY)
Facility: HOSPITAL | Age: 38
LOS: 1 days | Discharge: HOME | End: 2023-06-05
Attending: EMERGENCY MEDICINE
Payer: MEDICAID

## 2023-06-05 VITALS
DIASTOLIC BLOOD PRESSURE: 81 MMHG | SYSTOLIC BLOOD PRESSURE: 119 MMHG | HEIGHT: 63 IN | TEMPERATURE: 99 F | OXYGEN SATURATION: 100 % | HEART RATE: 88 BPM | RESPIRATION RATE: 18 BRPM | WEIGHT: 131.84 LBS

## 2023-06-05 PROCEDURE — 99284 EMERGENCY DEPT VISIT MOD MDM: CPT | Mod: 25

## 2023-06-05 PROCEDURE — 96375 TX/PRO/DX INJ NEW DRUG ADDON: CPT

## 2023-06-05 PROCEDURE — T1013: CPT

## 2023-06-05 PROCEDURE — 96374 THER/PROPH/DIAG INJ IV PUSH: CPT

## 2023-06-05 PROCEDURE — 99284 EMERGENCY DEPT VISIT MOD MDM: CPT

## 2023-06-05 RX ORDER — SODIUM CHLORIDE 9 MG/ML
1000 INJECTION INTRAMUSCULAR; INTRAVENOUS; SUBCUTANEOUS ONCE
Refills: 0 | Status: COMPLETED | OUTPATIENT
Start: 2023-06-05 | End: 2023-06-05

## 2023-06-05 RX ORDER — DIPHENHYDRAMINE HCL 50 MG
50 CAPSULE ORAL ONCE
Refills: 0 | Status: COMPLETED | OUTPATIENT
Start: 2023-06-05 | End: 2023-06-05

## 2023-06-05 RX ORDER — FAMOTIDINE 10 MG/ML
20 INJECTION INTRAVENOUS ONCE
Refills: 0 | Status: COMPLETED | OUTPATIENT
Start: 2023-06-05 | End: 2023-06-05

## 2023-06-05 RX ADMIN — SODIUM CHLORIDE 1000 MILLILITER(S): 9 INJECTION INTRAMUSCULAR; INTRAVENOUS; SUBCUTANEOUS at 22:30

## 2023-06-05 RX ADMIN — Medication 125 MILLIGRAM(S): at 22:30

## 2023-06-05 RX ADMIN — FAMOTIDINE 20 MILLIGRAM(S): 10 INJECTION INTRAVENOUS at 22:30

## 2023-06-05 RX ADMIN — Medication 50 MILLIGRAM(S): at 22:30

## 2023-06-05 NOTE — ED ADULT TRIAGE NOTE - BP NONINVASIVE SYSTOLIC (MM HG)
119 [No Acute Distress] : no acute distress [Well Nourished] : well nourished [Well Developed] : well developed [Well-Appearing] : well-appearing [Normal Sclera/Conjunctiva] : normal sclera/conjunctiva [PERRL] : pupils equal round and reactive to light [EOMI] : extraocular movements intact [Normal Outer Ear/Nose] : the outer ears and nose were normal in appearance [Normal Oropharynx] : the oropharynx was normal [No JVD] : no jugular venous distention [No Lymphadenopathy] : no lymphadenopathy [Supple] : supple [Thyroid Normal, No Nodules] : the thyroid was normal and there were no nodules present [No Respiratory Distress] : no respiratory distress  [No Accessory Muscle Use] : no accessory muscle use [Clear to Auscultation] : lungs were clear to auscultation bilaterally [Normal Rate] : normal rate  [Regular Rhythm] : with a regular rhythm [Normal S1, S2] : normal S1 and S2 [No Murmur] : no murmur heard [No Carotid Bruits] : no carotid bruits [No Abdominal Bruit] : a ~M bruit was not heard ~T in the abdomen [No Varicosities] : no varicosities [Pedal Pulses Present] : the pedal pulses are present [No Edema] : there was no peripheral edema [No Palpable Aorta] : no palpable aorta [No Extremity Clubbing/Cyanosis] : no extremity clubbing/cyanosis [Soft] : abdomen soft [Non Tender] : non-tender [Non-distended] : non-distended [No Masses] : no abdominal mass palpated [No HSM] : no HSM [Normal Bowel Sounds] : normal bowel sounds [Normal Posterior Cervical Nodes] : no posterior cervical lymphadenopathy [Normal Anterior Cervical Nodes] : no anterior cervical lymphadenopathy [No CVA Tenderness] : no CVA  tenderness [No Spinal Tenderness] : no spinal tenderness [No Joint Swelling] : no joint swelling [Grossly Normal Strength/Tone] : grossly normal strength/tone [No Rash] : no rash [Coordination Grossly Intact] : coordination grossly intact [No Focal Deficits] : no focal deficits [Normal Gait] : normal gait [Deep Tendon Reflexes (DTR)] : deep tendon reflexes were 2+ and symmetric [Normal Affect] : the affect was normal [Normal Insight/Judgement] : insight and judgment were intact

## 2023-06-05 NOTE — ED STATDOCS - ATTENDING APP SHARED VISIT CONTRIBUTION OF CARE
Carisa: I performed a face to face bedside interview with patient regarding history of present illness, review of symptoms and past medical history. I completed an independent physical exam and ordered tests/medications as needed.  I have discussed patient's plan of care with advanced care provider. The advanced care provider assisted in  executing the discussed plan. I was available for any questions or issues that may have arose during the execution of the plan of care.

## 2023-06-05 NOTE — ED STATDOCS - OBJECTIVE STATEMENT
ED  Kirsten 39 y/o female no pmh here 2 nights ago for abd discomfort, received meds, felt improved left and several hours later returned for pruitic urticarial type rash with sob. got iv benadryl/steroids/pepcid and d/michelle on benadryl. states improved for a day, last night began with rash again, feeling upper lip slightly swollen. no throat swelling, no difficluty swallowing, no vomiting, no significant sob. no abd pain. denies new exposures or detergents. no other complaints.

## 2023-06-05 NOTE — ED STATDOCS - CLINICAL SUMMARY MEDICAL DECISION MAKING FREE TEXT BOX
39 y/o female with recurrence of blanching pruitic rash with some urticaria/some papular components and mild lip swelling/feeling sob. no sign tongue/pharyngeal swelling. clear lungs, benign abd. will give anthistamines, fluids, steroids, if improved fernando d/c with steroids and f/u with allergist.

## 2023-06-05 NOTE — ED ADULT TRIAGE NOTE - CHIEF COMPLAINT QUOTE
c/o of worsening hives all over body that started Saturday nights. c/o of itching pain, diarrhea. Pt unknown what allergic too

## 2023-06-05 NOTE — ED STATDOCS - PROGRESS NOTE DETAILS
MARSHA Marti: Improved. MARSHA Marti: Patient evaluated by intake physician. HPI/ROS/PE as noted above. Will follow up plan per intake physician and continue to assess patient.

## 2023-06-05 NOTE — ED STATDOCS - CARE PROVIDER_API CALL
Jeanna Angela  Allergy and Immunology  [address] [place] [address]  Phone: [phone]  Fax: [phone]  Follow Up Time:

## 2023-06-05 NOTE — ED STATDOCS - PATIENT PORTAL LINK FT
You can access the FollowMyHealth Patient Portal offered by [facility] by registering at the following website: [email or web address]. By joining [employer]’s FollowMyHealth portal, you will also be able to view your health information using other applications (apps) compatible with our system.

## 2023-06-05 NOTE — ED STATDOCS - PHYSICAL EXAMINATION
Gen: No acute distress, non toxic  HEENT: Mucous membranes moist, pink conjunctivae, EOMI. nl pharynx. ?very slight edema to upper lip if any.   CV: RRR, nl s1/s2.  Resp: CTAB, normal rate and effort  GI: Abdomen soft, NT, ND. No rebound, no guarding  : No CVAT  Neuro: A&O x 3, moving all 4 extremities  MSK: No spine or joint tenderness to palpation  Skin: blanching urticarial and papular pruitic rash, over b/l knees, left thigh, upper pelvic region, some on back.

## 2023-06-05 NOTE — ED STATDOCS - NSFOLLOWUPINSTRUCTIONS_ED_ALL_ED_FT
- Prescription sent to pharmacy.  - Benadryl 25-50mg every 6-8 hours as needed.  - Please bring all documentation from your ED visit to any related future follow up appointment.  - Please call to schedule follow up appointment with your primary care physician within 24-48 hours.  - Please seek immediate medical attention or return to the ED for any new/worsening, signs/symptoms, or concerns.    Feel better!     Allergies, Adult    An allergy is a condition in which the body's defense system (immune system) comes in contact with an allergen and reacts to it. An allergen is anything that causes an allergic reaction. Allergens cause the immune system to make proteins for fighting infections (antibodies). These antibodies cause cells to release chemicals called histamines that set off the symptoms of an allergic reaction.    Allergies often affect the nasal passages (allergic rhinitis), eyes (allergic conjunctivitis), skin (atopic dermatitis), and stomach. Allergies can be mild, moderate, or severe. They cannot spread from person to person. Allergies can develop at any age and may be outgrown.    What are the causes?  This condition is caused by allergens. Common allergens include:    Outdoor allergens, such as pollen, car fumes, and mold.  Indoor allergens, such as dust, smoke, mold, and pet dander.  Other allergens, such as foods, medicines, scents, insect bites or stings, and other skin irritants.    What increases the risk?  You are more likely to develop this condition if you have:    Family members with allergies.  Family members who have any condition that may be caused by allergens, such as asthma. This may make you more likely to have other allergies.    What are the signs or symptoms?  Symptoms of this condition depend on the severity of the allergy.        Mild to moderate symptoms    Runny nose, stuffy nose (nasal congestion), or sneezing.  Itchy mouth, ears, or throat.  A feeling of mucus dripping down the back of your throat (postnasal drip).  Sore throat.  Itchy, red, watery, or puffy eyes.  Skin rash, or itchy, red, swollen areas of skin (hives).  Stomach cramps or bloating.        Severe symptoms    Severe allergies to food, medicine, or insect bites may cause anaphylaxis, which can be life-threatening. Symptoms include:    A red (flushed) face.  Wheezing or coughing.  Swollen lips, tongue, or mouth.  Tight or swollen throat.  Chest pain or tightness, or rapid heartbeat.  Trouble breathing or shortness of breath.  Pain in the abdomen, vomiting, or diarrhea.  Dizziness or fainting.    How is this diagnosed?  This condition is diagnosed based on your symptoms, your family and medical history, and a physical exam. You may also have tests, including:    Skin tests to see how your skin reacts to allergens that may be causing your symptoms. Tests include:    Skin prick test. For this test, an allergen is introduced to your body through a small opening in the skin.  Intradermal skin test. For this test, a small amount of allergen is injected under the first layer of your skin.  Patch test. For this test, a small amount of allergen is placed on your skin. The area is covered and then checked after a few days.  Blood tests.  A challenge test. For this test, you will eat or breathe in a small amount of allergen to see if you have an allergic reaction.    You may also be asked to:    Keep a food diary. This is a record of all the foods, drinks, and symptoms you have in a day.  Try an elimination diet. To do this:    Remove certain foods from your diet.  Add those foods back one by one to find out if any foods cause an allergic reaction.    How is this treated?      Treatment for allergies depends on your symptoms. Treatment may include:    Cold, wet cloths (cold compresses) to soothe itching and swelling.  Eye drops or nasal sprays.  Nasal irrigation to help clear your mucus or keep the nasal passages moist.  A humidifier to add moisture to the air.  Skin creams to treat rashes or itching.  Oral antihistamines or other medicines to block the reaction or to treat inflammation.  Diet changes to remove foods that cause allergies.  Being exposed again and again to tiny amounts of allergens to help you build a defense against it (tolerance). This is called immunotherapy. Examples include:    Allergy shot. You receive an injection that contains an allergen.  Sublingual immunotherapy. You take a small dose of allergen under your tongue.  Emergency injection for anaphylaxis. You give yourself a shot using a syringe (auto-injector) that contains the amount of medicine you need. Your health care provider will teach you how to give yourself an injection.    Follow these instructions at home:      Medicines     Take or apply over-the-counter and prescription medicines only as told by your health care provider.  Always carry your auto-injector pen if you are at risk of anaphylaxis. Give yourself an injection as told by your health care provider.        Eating and drinking    Follow instructions from your health care provider about eating or drinking restrictions.  Drink enough fluid to keep your urine pale yellow.        General instructions    Wear a medical alert bracelet or necklace to let others know that you have had anaphylaxis before.  Avoid known allergens whenever possible.  Keep all follow-up visits as told by your health care provider. This is important.    Contact a health care provider if:  Your symptoms do not get better with treatment.    Get help right away if:  You have symptoms of anaphylaxis. These include:    Swollen mouth, tongue, or throat.  Pain or tightness in your chest.  Trouble breathing or shortness of breath.  Dizziness or fainting.  Severe abdominal pain, vomiting, or diarrhea.    These symptoms may represent a serious problem that is an emergency. Do not wait to see if the symptoms will go away. Get medical help right away. Call your local emergency services (911 in the [place] Do not drive yourself to the hospital.    Summary  Take or apply over-the-counter and prescription medicines only as told by your health care provider.  Avoid known allergens when possible.  Always carry your auto-injector pen if you are at risk of anaphylaxis. Give yourself an injection as told by your health care provider.  Wear a medical alert bracelet or necklace to let others know that you have had anaphylaxis before.  Anaphylaxisis a life-threatening emergency. Get help right away.    ADDITIONAL NOTES AND INSTRUCTIONS    Please follow up with your Primary MD in 24-48 hr.  Seek immediate medical care for any new/worsening signs or symptoms.

## 2023-06-06 RX ORDER — DIPHENHYDRAMINE HCL 50 MG
0 CAPSULE ORAL
Qty: 21 | Refills: 0
Start: 2023-06-06

## 2023-06-06 RX ORDER — DIPHENHYDRAMINE HCL 50 MG
1 CAPSULE ORAL
Qty: 12 | Refills: 0
Start: 2023-06-06 | End: 2023-06-08

## 2023-06-06 RX ORDER — EPINEPHRINE 0.3 MG/.3ML
0.3 INJECTION INTRAMUSCULAR; SUBCUTANEOUS
Qty: 1 | Refills: 0
Start: 2023-06-06

## 2023-06-06 NOTE — ED ADULT NURSE NOTE - CAS ELECT INFOMATION PROVIDED
Patient discharged by provider MARSHA Marti. D/C paperwork given by MARSHA Marti, all signatures and pt education obtained and given by MARSHA Marti. No signs of acute distress noted, respirations even and unlabored. Refer to provider notes./DC instructions

## 2023-06-06 NOTE — ED ADULT NURSE NOTE - NSFALLUNIVINTERV_ED_ALL_ED
Bed/Stretcher in lowest position, wheels locked, appropriate side rails in place/Call bell, personal items and telephone in reach/Instruct patient to call for assistance before getting out of bed/chair/stretcher/Non-slip footwear applied when patient is off stretcher/[place] to call system/Physically safe environment - no spills, clutter or unnecessary equipment/Purposeful proactive rounding/Room/bathroom lighting operational, light cord in reach

## 2023-06-06 NOTE — ED ADULT NURSE NOTE - OBJECTIVE STATEMENT
Assumed care of pt A&Ox4 resp even/unlabored, ambulatory, steady gait noted. Pt presenting c/o allergic reaction. Pt c/o of worsening hives all over her body that started Saturday night. Pt c/o of itching pain, diarrhea. Pt unknown what allergic too. Pt denies eating any new foods, using new laundry detergent or body soap, airway patent, pt speaking in full sentences, no resp distress, no acute distress noted. Pt denies any other complaints

## 2023-06-06 NOTE — ED ADULT NURSE REASSESSMENT NOTE - NS ED NURSE REASSESS COMMENT FT1
Patient discharged by provider MARSHA Marti. D/C paperwork given by MARSHA Marti, all signatures and pt education obtained and given by MARSHA Marti. No signs of acute distress noted, respirations even and unlabored. Refer to provider notes.

## 2024-03-06 NOTE — ED ADULT TRIAGE NOTE - PATIENT/CAREGIVER ACCEPTED INTERPRETER SERVICES
yes [Flexion] : flexion [Extension] : extension [5___] : left hip flexion 5[unfilled]/5 [] : lumbar paraspinal tenderness [TWNoteComboBox7] : forward flexion 75 degrees [de-identified] : extension 20 degrees

## 2024-04-17 NOTE — ED PROVIDER NOTE - IV ALTEPLASE EXCL ABS HIDDEN
Medication: calcitonin  Medication refill denied due to not prescribed by this provider   Monitor. show

## 2024-05-03 NOTE — ED ADULT TRIAGE NOTE - ARRIVAL FROM
Patient states that within the last month or so it seems as though his doxepin has not been effective in helping him sleep. He reports frequently not being able to fall asleep until the wee hours of the morning. He denies any changes to routine, exercise, medications, etc. That might explain this. He's wondering if something might need changing whether it be a dose change or a different medivcation. He also states over the last week or so that he has been having some SOB with activity requiring him to rest momentarily to catch his breath. He did not want to schedule at this time, but wanted to have this info sent to PCP.    Routing to PCP for guidance.   Home

## 2024-09-05 PROBLEM — Z00.00 ENCOUNTER FOR PREVENTIVE HEALTH EXAMINATION: Status: ACTIVE | Noted: 2024-09-05

## 2024-09-16 ENCOUNTER — APPOINTMENT (OUTPATIENT)
Dept: NEUROLOGY | Facility: CLINIC | Age: 39
End: 2024-09-16
Payer: MEDICAID

## 2024-09-16 VITALS
SYSTOLIC BLOOD PRESSURE: 104 MMHG | DIASTOLIC BLOOD PRESSURE: 68 MMHG | WEIGHT: 126 LBS | HEART RATE: 63 BPM | OXYGEN SATURATION: 98 %

## 2024-09-16 DIAGNOSIS — G43.909 MIGRAINE, UNSPECIFIED, NOT INTRACTABLE, W/OUT STATUS MIGRAINOSUS: ICD-10-CM

## 2024-09-16 DIAGNOSIS — G40.909 EPILEPSY, UNSPECIFIED, NOT INTRACTABLE, W/OUT STATUS EPILEPTICUS: ICD-10-CM

## 2024-09-16 PROCEDURE — 99204 OFFICE O/P NEW MOD 45 MIN: CPT

## 2024-09-16 RX ORDER — PHENYTOIN 125 MG/5ML
100 SUSPENSION ORAL
Refills: 0 | Status: ACTIVE | COMMUNITY

## 2024-09-16 RX ORDER — ASPIRIN 81 MG/1
81 TABLET ORAL
Refills: 0 | Status: ACTIVE | COMMUNITY

## 2024-09-18 NOTE — ASSESSMENT
[FreeTextEntry1] : 39-year-old female with history of seizures was on carbamazepine for many years .  Patient is unable to give exact descriptions of her episodes and the most recent hospital admission is very vague and there are no records for review.  She is on Dilantin 100 mg daily with no adverse effects, will obtain MRI and EEG. consider Lacosamide   Migraines Start vitamin supplements rizatriptan prn  f/u after testing

## 2024-09-18 NOTE — REASON FOR VISIT
[Initial Eval - Existing Diagnosis] : an initial evaluation of an existing diagnosis [[employer] ] : provided by [employer]   [Interpreters_IDNumber] : [id number] [Interpreters_Relationshiptopatient] : dandre [TWNoteComboBox1] : Marshallese
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
## 2024-09-18 NOTE — REASON FOR VISIT
[Initial Eval - Existing Diagnosis] : an initial evaluation of an existing diagnosis [[employer] ] : provided by [employer]   [Interpreters_IDNumber] : [id number] [Interpreters_Relationshiptopatient] : dandre [TWNoteComboBox1] : Bahamian

## 2024-09-30 ENCOUNTER — APPOINTMENT (OUTPATIENT)
Dept: NEUROLOGY | Facility: CLINIC | Age: 39
End: 2024-09-30

## 2024-09-30 PROCEDURE — 93040 RHYTHM ECG WITH REPORT: CPT

## 2024-09-30 PROCEDURE — 95816 EEG AWAKE AND DROWSY: CPT

## 2024-10-01 PROCEDURE — 95708 EEG WO VID EA 12-26HR UNMNTR: CPT

## 2024-10-01 PROCEDURE — 95719 EEG PHYS/QHP EA INCR W/O VID: CPT

## 2024-10-01 PROCEDURE — 95700 EEG CONT REC W/VID EEG TECH: CPT

## 2024-10-15 ENCOUNTER — APPOINTMENT (OUTPATIENT)
Dept: NEUROLOGY | Facility: CLINIC | Age: 39
End: 2024-10-15
Payer: COMMERCIAL

## 2024-10-15 VITALS
SYSTOLIC BLOOD PRESSURE: 116 MMHG | BODY MASS INDEX: 24.03 KG/M2 | DIASTOLIC BLOOD PRESSURE: 77 MMHG | WEIGHT: 124 LBS | OXYGEN SATURATION: 99 % | HEIGHT: 60.24 IN | HEART RATE: 72 BPM

## 2024-10-15 PROCEDURE — G2211 COMPLEX E/M VISIT ADD ON: CPT | Mod: NC

## 2024-10-15 PROCEDURE — 99214 OFFICE O/P EST MOD 30 MIN: CPT

## 2024-10-15 RX ORDER — VITAMIN E (DL,TOCOPHERYL ACET) 180 MG
CAPSULE ORAL
Refills: 0 | Status: ACTIVE | COMMUNITY

## 2024-10-15 RX ORDER — LACOSAMIDE 50 MG/1
50 TABLET ORAL
Qty: 120 | Refills: 5 | Status: ACTIVE | COMMUNITY
Start: 2024-10-15 | End: 1900-01-01

## 2024-10-15 RX ORDER — RIBOFLAVIN (VITAMIN B2) 50 MG
TABLET ORAL
Refills: 0 | Status: ACTIVE | COMMUNITY

## 2024-10-16 ENCOUNTER — APPOINTMENT (OUTPATIENT)
Dept: NEUROLOGY | Facility: CLINIC | Age: 39
End: 2024-10-16

## 2024-10-17 ENCOUNTER — APPOINTMENT (OUTPATIENT)
Dept: OBGYN | Facility: CLINIC | Age: 39
End: 2024-10-17
Payer: COMMERCIAL

## 2024-10-17 VITALS
BODY MASS INDEX: 26.74 KG/M2 | DIASTOLIC BLOOD PRESSURE: 84 MMHG | SYSTOLIC BLOOD PRESSURE: 122 MMHG | WEIGHT: 138 LBS | HEIGHT: 60.24 IN

## 2024-10-17 DIAGNOSIS — N39.3 STRESS INCONTINENCE (FEMALE) (MALE): ICD-10-CM

## 2024-10-17 DIAGNOSIS — Z01.419 ENCOUNTER FOR GYNECOLOGICAL EXAMINATION (GENERAL) (ROUTINE) W/OUT ABNORMAL FINDINGS: ICD-10-CM

## 2024-10-17 DIAGNOSIS — Z78.9 OTHER SPECIFIED HEALTH STATUS: ICD-10-CM

## 2024-10-17 DIAGNOSIS — N97.9 FEMALE INFERTILITY, UNSPECIFIED: ICD-10-CM

## 2024-10-17 DIAGNOSIS — G40.909 EPILEPSY, UNSPECIFIED, NOT INTRACTABLE, W/OUT STATUS EPILEPTICUS: ICD-10-CM

## 2024-10-17 PROCEDURE — 99459 PELVIC EXAMINATION: CPT

## 2024-10-17 PROCEDURE — 99386 PREV VISIT NEW AGE 40-64: CPT

## 2024-10-17 PROCEDURE — 99203 OFFICE O/P NEW LOW 30 MIN: CPT | Mod: 25

## 2024-10-18 LAB
C TRACH RRNA SPEC QL NAA+PROBE: NOT DETECTED
FSH SERPL-MCNC: 9.8 IU/L
HBV SURFACE AG SER QL: NONREACTIVE
HCT VFR BLD CALC: 42.1 %
HCV AB SER QL: NONREACTIVE
HCV S/CO RATIO: 0.41 S/CO
HGB BLD-MCNC: 13.4 G/DL
HIV1+2 AB SPEC QL IA.RAPID: NONREACTIVE
HPV HIGH+LOW RISK DNA PNL CVX: NOT DETECTED
HSV 1+2 IGG SER IA-IMP: NEGATIVE
HSV 1+2 IGG SER IA-IMP: POSITIVE
HSV1 IGG SER QL: >62.2 INDEX
HSV2 IGG SER QL: 0.13 INDEX
LH SERPL-ACNC: 6 IU/L
MCHC RBC-ENTMCNC: 29.9 PG
MCHC RBC-ENTMCNC: 31.8 GM/DL
MCV RBC AUTO: 94 FL
N GONORRHOEA RRNA SPEC QL NAA+PROBE: NOT DETECTED
PLATELET # BLD AUTO: 401 K/UL
PROLACTIN SERPL-MCNC: 13.6 NG/ML
RBC # BLD: 4.48 M/UL
RBC # FLD: 13.2 %
SOURCE TP AMPLIFICATION: NORMAL
T PALLIDUM AB SER QL IA: NEGATIVE
T3FREE SERPL-MCNC: 3.99 PG/ML
T4 FREE SERPL-MCNC: 1.1 NG/DL
TESTOST SERPL-MCNC: 10.4 NG/DL
TSH SERPL-ACNC: 1.33 UIU/ML
WBC # FLD AUTO: 5.57 K/UL

## 2024-10-22 ENCOUNTER — APPOINTMENT (OUTPATIENT)
Dept: MRI IMAGING | Facility: CLINIC | Age: 39
End: 2024-10-22

## 2024-10-22 ENCOUNTER — OUTPATIENT (OUTPATIENT)
Dept: OUTPATIENT SERVICES | Facility: HOSPITAL | Age: 39
LOS: 1 days | End: 2024-10-22

## 2024-10-22 DIAGNOSIS — G40.909 EPILEPSY, UNSPECIFIED, NOT INTRACTABLE, WITHOUT STATUS EPILEPTICUS: ICD-10-CM

## 2024-10-22 DIAGNOSIS — G43.909 MIGRAINE, UNSPECIFIED, NOT INTRACTABLE, WITHOUT STATUS MIGRAINOSUS: ICD-10-CM

## 2024-10-23 LAB — CYTOLOGY CVX/VAG DOC THIN PREP: NORMAL

## 2024-11-13 ENCOUNTER — APPOINTMENT (OUTPATIENT)
Dept: OBGYN | Facility: CLINIC | Age: 39
End: 2024-11-13

## 2024-11-22 ENCOUNTER — NON-APPOINTMENT (OUTPATIENT)
Age: 39
End: 2024-11-22

## 2024-12-17 ENCOUNTER — APPOINTMENT (OUTPATIENT)
Dept: NEUROLOGY | Facility: CLINIC | Age: 39
End: 2024-12-17

## 2025-01-06 ENCOUNTER — APPOINTMENT (OUTPATIENT)
Dept: OBGYN | Facility: CLINIC | Age: 40
End: 2025-01-06
Payer: SELF-PAY

## 2025-01-06 VITALS
SYSTOLIC BLOOD PRESSURE: 110 MMHG | DIASTOLIC BLOOD PRESSURE: 80 MMHG | WEIGHT: 143 LBS | HEIGHT: 60 IN | BODY MASS INDEX: 28.07 KG/M2

## 2025-01-06 DIAGNOSIS — N97.9 FEMALE INFERTILITY, UNSPECIFIED: ICD-10-CM

## 2025-01-06 DIAGNOSIS — Z00.00 ENCOUNTER FOR GENERAL ADULT MEDICAL EXAMINATION W/OUT ABNORMAL FINDINGS: ICD-10-CM

## 2025-01-06 PROCEDURE — 99214 OFFICE O/P EST MOD 30 MIN: CPT

## 2025-01-06 RX ORDER — PNV NO.95/FERROUS FUM/FOLIC AC 28MG-0.8MG
28-0.8 TABLET ORAL DAILY
Qty: 90 | Refills: 3 | Status: ACTIVE | COMMUNITY
Start: 2025-01-06 | End: 1900-01-01

## 2025-02-18 ENCOUNTER — RESULT CHARGE (OUTPATIENT)
Age: 40
End: 2025-02-18

## 2025-02-18 ENCOUNTER — APPOINTMENT (OUTPATIENT)
Dept: OBGYN | Facility: CLINIC | Age: 40
End: 2025-02-18
Payer: MEDICAID

## 2025-02-18 VITALS
BODY MASS INDEX: 28.17 KG/M2 | WEIGHT: 143.5 LBS | SYSTOLIC BLOOD PRESSURE: 100 MMHG | HEIGHT: 60 IN | DIASTOLIC BLOOD PRESSURE: 70 MMHG

## 2025-02-18 DIAGNOSIS — N92.6 IRREGULAR MENSTRUATION, UNSPECIFIED: ICD-10-CM

## 2025-02-18 PROCEDURE — 58558Z: CUSTOM

## 2025-02-24 LAB — CORE LAB BIOPSY: NORMAL

## 2025-03-18 ENCOUNTER — APPOINTMENT (OUTPATIENT)
Dept: OBGYN | Facility: CLINIC | Age: 40
End: 2025-03-18
Payer: MEDICAID

## 2025-03-18 VITALS
BODY MASS INDEX: 21.49 KG/M2 | SYSTOLIC BLOOD PRESSURE: 100 MMHG | HEIGHT: 69 IN | WEIGHT: 145.06 LBS | DIASTOLIC BLOOD PRESSURE: 70 MMHG

## 2025-03-18 DIAGNOSIS — R35.89 OTHER POLYURIA: ICD-10-CM

## 2025-03-18 PROCEDURE — 99214 OFFICE O/P EST MOD 30 MIN: CPT | Mod: 25

## 2025-03-18 PROCEDURE — 81003 URINALYSIS AUTO W/O SCOPE: CPT | Mod: QW

## 2025-03-18 RX ORDER — PNV NO.95/FERROUS FUM/FOLIC AC 28MG-0.8MG
28-0.8 TABLET ORAL DAILY
Qty: 90 | Refills: 3 | Status: ACTIVE | COMMUNITY
Start: 2025-03-18 | End: 1900-01-01

## 2025-03-19 LAB
APPEARANCE: CLEAR
BILIRUBIN URINE: NEGATIVE
BLOOD URINE: NEGATIVE
COLOR: YELLOW
GLUCOSE QUALITATIVE U: NEGATIVE MG/DL
KETONES URINE: NEGATIVE MG/DL
LEUKOCYTE ESTERASE URINE: NEGATIVE
NITRITE URINE: NEGATIVE
PH URINE: 7
PROTEIN URINE: NEGATIVE MG/DL
SPECIFIC GRAVITY URINE: <1.005
UROBILINOGEN URINE: 0.2 MG/DL

## 2025-04-08 ENCOUNTER — APPOINTMENT (OUTPATIENT)
Dept: OBGYN | Facility: CLINIC | Age: 40
End: 2025-04-08